# Patient Record
(demographics unavailable — no encounter records)

---

## 2025-04-01 NOTE — ASSESSMENT
[FreeTextEntry1] :  [Assessment]  63y Male with MetALD and hx of LFT elevation, HTN, HLD   [Plan]  # MetALD # Hx of LFT elevation LFT WNL No cirrhosis No fibrosis - Education and counseling were provided to the patient. - Avoid alcohol, herb, dietary supplement - Advised weight loss 5-10% - Follow up with PCP to manage metabolic diseases  # Eval for HBV labs - PT had indeterminate Hep B 7/2024. (PT had Hep B vaccine in 2000s) - 3/20/25eval for Hep B:  HBcore total Ab- HBeAg- HBeAb-  HBsAb 179 HBsAg Non-reactive HBV PCR ND Pt had immune to Hep B due to vaccination >> so pt's prev HBsAG is false positive.    Continue to f/u with Dr. Andree ELENA as needed

## 2025-04-01 NOTE — PHYSICAL EXAM
[Scleral Icterus] : Scleral Icterus noted [Abdominal Bruit] : no abdominal bruit [Abdominal  Ascites] : no ascites [Asterixis] : no asterixis observed [Jaundice] : No jaundice [Palmar Erythema] : no Palmar Erythema [General Appearance - Alert] : alert [General Appearance - In No Acute Distress] : in no acute distress [Neck Appearance] : the appearance of the neck was normal [Respiration, Rhythm And Depth] : normal respiratory rhythm and effort [Exaggerated Use Of Accessory Muscles For Inspiration] : no accessory muscle use [Auscultation Breath Sounds / Voice Sounds] : lungs were clear to auscultation bilaterally [Apical Impulse] : the apical impulse was normal [Heart Rate And Rhythm] : heart rate was normal and rhythm regular [Heart Sounds] : normal S1 and S2 [Edema] : there was no peripheral edema [Bowel Sounds] : normal bowel sounds [Abdomen Soft] : soft [Abdomen Tenderness] : non-tender [Abnormal Walk] : normal gait [Skin Turgor] : normal skin turgor [] : no rash [FreeTextEntry1] : reddened  reddened "always been red"  [No Focal Deficits] : no focal deficits [Oriented To Time, Place, And Person] : oriented to person, place, and time [Impaired Insight] : insight and judgment were intact [Affect] : the affect was normal

## 2025-04-01 NOTE — HISTORY OF PRESENT ILLNESS
[FreeTextEntry1] : Referring from PCP Dr. Douglas Candelario Pacific : Sammarinese speaking (ID# [id number])  Crow Rosario is a 63y Male with MetALD and hx of LFT elevation, HTN, HLD who presents for an follow up.  [Eval for HBV labs] - PT had indeterminate Hep B 7/2024. (PT had Hep B vaccine in 2000s) - 3/20/25eval for Hep B:  HBcore total Ab- HBeAg- HBeAb-  HBsAb 179 HBsAg Non-reactive HBV PCR ND Pt had immune to Hep B due to vaccination >> so pt's prev HBsAG is false positive.   Fibroscan 6/20/24 --> F0 S0 Today he reports feeling well and no physical complaints.   [Medical Hx] See HPI [Surgical Hx] Denies [Allergies] Denies [FmH of liver disease] Cousin w/ liver disease, "somebody has problems but not very clear" [Social Hx] - Occupation: Retired, construction - Alcohol:  used to "Little bit with meals" Glass of wine with every lunch & dinner meal x many years. Quit whiskey/beer over a year (whiskey post meals, more over the weekends) - Tobacco: Denies - Illicit drug: Denies - Herb and dietary Supplement: Teas (noah, lemon, mint) - Diet: Rice, limits fried food intake. Eats high protein diet (does not eat beef)  [Medications]  HTN med only  [Labs] 3/20/25 LFT wnl INR wnl  GFR 99 Cr 0.81 HBcore total Ab- HBeAg- HBeAb-  HBsAb 179 HBsAg Non-reactive HBV PCR ND AFP 3.2  7/15/24 HBsAg indeterminate HBV PCR ND  7/8/24 AFP 3.2 A1AT WNL HBsAg INDETERMINATE x2, HbcAb -, HbsAb + (quant 106.6), HBV PCR not detected, ferritin , platelet 266, GGT 29, TIBC WNL AST/ALT, ALKP 24/27; 67, TB 0.4  [Imaging] US ABD 11/15/23: Liver: Increased echogenicity is noted. It measures approximately 16 cm in length. A 1.8 x 0.7 x 1.2 cm calcification is noted in the right lobe. Impression: Increased echogenicity within the liver suggestive of fatty infiltration or diffuse hepatocellular disease. Hepatic calcification likely representing a granuloma. These findings were noted previously.  [Procedures] EGD 2017: no EV  Colonoscopy 2017 by Dr. Cash: mild diverticulosis of whole colon, Internal hemorrhoids. >> 2027

## 2025-04-01 NOTE — HISTORY OF PRESENT ILLNESS
[FreeTextEntry1] : Referring from PCP Dr. Douglas Candelario Pacific : Qat[place] speaking (ID# [id number])  Crow Rosario is a 63y Male with MetALD and hx of LFT elevation, HTN, HLD who presents for an follow up.  [Eval for HBV labs] - PT had indeterminate Hep B 7/2024. (PT had Hep B vaccine in 2000s) - 3/20/25eval for Hep B:  HBcore total Ab- HBeAg- HBeAb-  HBsAb 179 HBsAg Non-reactive HBV PCR ND Pt had immune to Hep B due to vaccination >> so pt's prev HBsAG is false positive.   Fibroscan 6/20/24 --> F0 S0 Today he reports feeling well and no physical complaints.   [Medical Hx] See HPI [Surgical Hx] Denies [Allergies] Denies [FmH of liver disease] Cousin w/ liver disease, "somebody has problems but not very clear" [Social Hx] - Occupation: Retired, construction - Alcohol:  used to "Little bit with meals" Glass of wine with every lunch & dinner meal x many years. Quit whiskey/beer over a year (whiskey post meals, more over the weekends) - Tobacco: Denies - Illicit drug: Denies - Herb and dietary Supplement: Teas (noah, lemon, mint) - Diet: Rice, limits fried food intake. Eats high protein diet (does not eat beef)  [Medications]  HTN med only  [Labs] 3/20/25 LFT wnl INR wnl  GFR 99 Cr 0.81 HBcore total Ab- HBeAg- HBeAb-  HBsAb 179 HBsAg Non-reactive HBV PCR ND AFP 3.2  7/15/24 HBsAg indeterminate HBV PCR ND  7/8/24 AFP 3.2 A1AT WNL HBsAg INDETERMINATE x2, HbcAb -, HbsAb + (quant 106.6), HBV PCR not detected, ferritin , platelet 266, GGT 29, TIBC WNL AST/ALT, ALKP 24/27; 67, TB 0.4  [Imaging] US ABD 11/15/23: Liver: Increased echogenicity is noted. It measures approximately 16 cm in length. A 1.8 x 0.7 x 1.2 cm calcification is noted in the right lobe. Impression: Increased echogenicity within the liver suggestive of fatty infiltration or diffuse hepatocellular disease. Hepatic calcification likely representing a granuloma. These findings were noted previously.  [Procedures] EGD 2017: no EV  Colonoscopy 2017 by Dr. Cash: mild diverticulosis of whole colon, Internal hemorrhoids. >> 2027

## 2025-04-01 NOTE — REVIEW OF SYSTEMS
[Fever] : no fever [Chills] : no chills [Feeling Poorly] : not feeling poorly [Feeling Tired] : not feeling tired [Heart Rate Is Slow] : the heart rate was not slow [Heart Rate Is Fast] : the heart rate was not fast [Chest Pain] : no chest pain [Palpitations] : no palpitations [Lower Ext Edema] : no extremity edema [Shortness Of Breath] : no shortness of breath [Wheezing] : no wheezing [Cough] : no cough [Abdominal Pain] : no abdominal pain [Vomiting] : no vomiting [Constipation] : no constipation [Diarrhea] : no diarrhea [Melena] : no melena [Skin Lesions] : no skin lesions [Itching] : no itching [Confused] : no confusion